# Patient Record
Sex: FEMALE | Race: WHITE | NOT HISPANIC OR LATINO | Employment: OTHER | URBAN - METROPOLITAN AREA
[De-identification: names, ages, dates, MRNs, and addresses within clinical notes are randomized per-mention and may not be internally consistent; named-entity substitution may affect disease eponyms.]

---

## 2020-10-06 ENCOUNTER — TELEPHONE (OUTPATIENT)
Dept: OBSTETRICS AND GYNECOLOGY | Facility: CLINIC | Age: 50
End: 2020-10-06

## 2020-10-06 DIAGNOSIS — Z12.31 VISIT FOR SCREENING MAMMOGRAM: Primary | ICD-10-CM

## 2020-10-06 NOTE — TELEPHONE ENCOUNTER
----- Message from Bria Manzanares sent at 10/6/2020  1:13 PM CDT -----  Contact: self  Requesting call back regarding pt getting orders for labs sent in the mail. Please call back at [phone].

## 2020-10-06 NOTE — TELEPHONE ENCOUNTER
Pt is wanting lab orders for her annual labs. Pt is also requesting ot have thyroid levels and hormones checked. Pt will pickup from .

## 2020-10-13 ENCOUNTER — TELEPHONE (OUTPATIENT)
Dept: OBSTETRICS AND GYNECOLOGY | Facility: CLINIC | Age: 50
End: 2020-10-13

## 2020-10-13 NOTE — TELEPHONE ENCOUNTER
----- Message from Lyric Valero MD sent at 10/13/2020  7:02 AM CDT -----  Please notify patient of normal mammogram result.

## 2020-10-15 ENCOUNTER — TELEPHONE (OUTPATIENT)
Dept: OBSTETRICS AND GYNECOLOGY | Facility: CLINIC | Age: 50
End: 2020-10-15

## 2020-10-15 NOTE — TELEPHONE ENCOUNTER
----- Message from Litzy Vasquez sent at 10/15/2020  4:18 PM CDT -----  Type:  Patient Returning Call    Who Called:pt   Who Left Message for Patient: na  Does the patient know what this is regarding?:na  Would the patient rather a call back or a response via [facility]? Callback   Best Call Back Number:[phone]   Additional Information:

## 2020-10-15 NOTE — TELEPHONE ENCOUNTER
Spoke w/pt, she is wanting lab orders faxed to [facility] in [place]. Orders faxed.

## 2020-10-26 RX ORDER — SODIUM BICARBONATE 650 MG/1
TABLET ORAL
COMMUNITY
Start: 2020-09-16 | End: 2022-10-03

## 2020-10-26 RX ORDER — CARVEDILOL 12.5 MG/1
TABLET ORAL
COMMUNITY
Start: 2020-07-25

## 2020-10-27 ENCOUNTER — OFFICE VISIT (OUTPATIENT)
Dept: OBSTETRICS AND GYNECOLOGY | Facility: CLINIC | Age: 50
End: 2020-10-27
Payer: COMMERCIAL

## 2020-10-27 VITALS
DIASTOLIC BLOOD PRESSURE: 92 MMHG | BODY MASS INDEX: 22.21 KG/M2 | SYSTOLIC BLOOD PRESSURE: 161 MMHG | WEIGHT: 138.19 LBS | HEIGHT: 66 IN | HEART RATE: 64 BPM

## 2020-10-27 DIAGNOSIS — Z01.419 WELL WOMAN EXAM WITH ROUTINE GYNECOLOGICAL EXAM: Primary | ICD-10-CM

## 2020-10-27 DIAGNOSIS — Z12.31 SCREENING MAMMOGRAM, ENCOUNTER FOR: ICD-10-CM

## 2020-10-27 DIAGNOSIS — Z23 FLU VACCINE NEED: ICD-10-CM

## 2020-10-27 PROCEDURE — 3008F BODY MASS INDEX DOCD: CPT | Mod: CPTII,S$GLB,, | Performed by: OBSTETRICS & GYNECOLOGY

## 2020-10-27 PROCEDURE — 90686 IIV4 VACC NO PRSV 0.5 ML IM: CPT | Mod: S$GLB,,, | Performed by: OBSTETRICS & GYNECOLOGY

## 2020-10-27 PROCEDURE — 3008F PR BODY MASS INDEX (BMI) DOCUMENTED: ICD-10-PCS | Mod: CPTII,S$GLB,, | Performed by: OBSTETRICS & GYNECOLOGY

## 2020-10-27 PROCEDURE — 90471 IMMUNIZATION ADMIN: CPT | Mod: S$GLB,,, | Performed by: OBSTETRICS & GYNECOLOGY

## 2020-10-27 PROCEDURE — 99396 PR PREVENTIVE VISIT,EST,40-64: ICD-10-PCS | Mod: 25,S$GLB,, | Performed by: OBSTETRICS & GYNECOLOGY

## 2020-10-27 PROCEDURE — 90471 PR IMMUNIZ ADMIN,1 SINGLE/COMB VAC/TOXOID: ICD-10-PCS | Mod: S$GLB,,, | Performed by: OBSTETRICS & GYNECOLOGY

## 2020-10-27 PROCEDURE — 99396 PREV VISIT EST AGE 40-64: CPT | Mod: 25,S$GLB,, | Performed by: OBSTETRICS & GYNECOLOGY

## 2020-10-27 PROCEDURE — 90686 PR FLU VACCINE, QIIV4, NO PRSV, 0.5 ML, IM: ICD-10-PCS | Mod: S$GLB,,, | Performed by: OBSTETRICS & GYNECOLOGY

## 2020-10-27 RX ORDER — LANOLIN ALCOHOL/MO/W.PET/CERES
1000 CREAM (GRAM) TOPICAL
COMMUNITY

## 2020-10-27 RX ORDER — ACETAMINOPHEN 500 MG
500 TABLET ORAL EVERY 6 HOURS PRN
COMMUNITY

## 2020-10-27 RX ORDER — CETIRIZINE HYDROCHLORIDE 10 MG/1
10 TABLET ORAL DAILY PRN
COMMUNITY

## 2020-10-27 NOTE — PROGRESS NOTES
Lubna Garcia is a 50 y.o. female  who presents for a well woman exam.  She has no issues, problems, or complaints.She is currently completely evaluation to get on a renal transplant list. She is starting to have more hot flashes and insomnia.     Past Medical History:   Diagnosis Date    HTN (hypertension)     Liver cyst     Nephrolithiasis     h/o    Pancreas cyst     Polycystic kidney disease        Past Surgical History:   Procedure Laterality Date    DIAGNOSTIC LAPAROSCOPY      HYSTEROSCOPY WITH DILATION AND CURETTAGE OF UTERUS      novasure ablation    INGUINAL HERNIA REPAIR Left     TONSILLECTOMY AND ADENOIDECTOMY         OB History    Para Term  AB Living   2 2       2   SAB TAB Ectopic Multiple Live Births                  # Outcome Date GA Lbr Reece/2nd Weight Sex Delivery Anes PTL Lv   2 Para            1 Para                History reviewed. No pertinent family history.    Social History     Tobacco Use    Smoking status: Never Smoker    Smokeless tobacco: Never Used   Substance Use Topics    Alcohol use: Not on file    Drug use: Not on file         Current Outpatient Medications:     acetaminophen (TYLENOL) 500 MG tablet, Take 500 mg by mouth every 6 (six) hours as needed., Disp: , Rfl:     acetaminophen-pamabrom-pyrilam 500-25-15 mg Tab, Take 1 tablet by mouth daily as needed., Disp: , Rfl:     carvediloL (COREG) 12.5 MG tablet, , Disp: , Rfl:     cetirizine (ZYRTEC) 10 MG tablet, Take 10 mg by mouth daily as needed., Disp: , Rfl:     cyanocobalamin (VITAMIN B-12) 1000 MCG tablet, Take 1,000 mcg by mouth., Disp: , Rfl:     sodium bicarbonate 650 MG tablet, , Disp: , Rfl:     SODIUM BICARBONATE ORAL, Take 650 mg by mouth., Disp: , Rfl:     Current Facility-Administered Medications:     influenza (QUADRIVALENT PF) vaccine 0.5 mL, 0.5 mL, Intramuscular, vaccine x 1 dose, Lyric Valero MD     Review of patient's allergies indicates:   Allergen Reactions    Sulfa  "(sulfonamide antibiotics)         ROS:  GENERAL: Denies weight gain or weight loss. Feeling well overall.   SKIN: Denies rash or lesions.   HEAD: Denies head injury or headache.   NODES: Denies enlarged lymph nodes.   CHEST: Denies shortness of breath.   CARDIOVASCULAR: Denies palpitations or chest pain.   ABDOMEN: Denies abdominal pain, constipation, diarrhea, nausea, vomiting or rectal bleeding.   URINARY: Denies frequency, dysuria, hematuria, or burning on urination.  REPRODUCTIVE: See HPI.   BREASTS: Denies pain, lumps, or nipple discharge.   HEMATOLOGIC: Denies easy bruisability or excessive bleeding.  MUSCULOSKELETAL: Denies joint pain or swelling.   NEUROLOGIC: Denies syncope or weakness.   PSYCHIATRIC: Denies depression, anxiety or mood swings.    PHYSICAL EXAM:    BP (!) 161/92   Pulse 64   Ht 5' 6" (1.676 m)   Wt 62.7 kg (138 lb 3.2 oz)   BMI 22.31 kg/m²    Body mass index is 22.31 kg/m².     APPEARANCE: Well nourished, well developed, in no acute distress.  AFFECT: WNL, alert and oriented x 3  SKIN: No acne or hirsutism  NECK: Neck symmetric without masses or thyromegaly  NODES: No inguinal, cervical, axillary, or femoral lymph node enlargement  CHEST: Good respiratory effect  ABDOMEN: Soft.  No tenderness or masses.  No hepatosplenomegaly.  No hernias.  BREASTS: Symmetrical, no skin changes or visible lesions.  No palpable masses, nipple discharge bilaterally.  PELVIC: Normal external genitalia without lesions.  Normal hair distribution.  Adequate perineal body, normal urethral meatus.  Urethra and bladder without tenderness or masses. Vagina moist and well rugated without lesions or discharge.  Cervix pink, without lesions, discharge or tenderness.  No significant cystocele or rectocele.  Bimanual exam shows uterus to be normal size, regular, mobile and nontender.  Adnexa without masses or tenderness.    EXTREMITIES: No edema.     Well woman exam with routine gynecological exam  -     Liquid-based " pap smear, screening    Screening mammogram, encounter for  -     Mammo Digital Screening Bilat w/ [facility]; Future    Flu vaccine need  -     influenza (QUADRIVALENT PF) vaccine 0.5 mL       DEXA next year  Patient was counseled today on A.C.S. Pap guidelines and recommendations for yearly pelvic exams, mammograms and monthly self breast exams; to see her PCP for other health maintenance.     Follow up in 1 year

## 2020-10-30 ENCOUNTER — PATIENT MESSAGE (OUTPATIENT)
Dept: OBSTETRICS AND GYNECOLOGY | Facility: CLINIC | Age: 50
End: 2020-10-30

## 2020-10-30 DIAGNOSIS — N95.1 MENOPAUSAL SYMPTOMS: Primary | ICD-10-CM

## 2020-10-30 RX ORDER — ESTRADIOL AND NORETHINDRONE ACETATE .5; .1 MG/1; MG/1
1 TABLET ORAL DAILY
Qty: 28 TABLET | Refills: 12 | Status: SHIPPED | OUTPATIENT
Start: 2020-10-30 | End: 2020-12-14 | Stop reason: SDUPTHER

## 2020-12-14 ENCOUNTER — PATIENT MESSAGE (OUTPATIENT)
Dept: OBSTETRICS AND GYNECOLOGY | Facility: CLINIC | Age: 50
End: 2020-12-14

## 2020-12-14 DIAGNOSIS — N95.1 MENOPAUSAL SYMPTOMS: ICD-10-CM

## 2020-12-14 RX ORDER — ESTRADIOL AND NORETHINDRONE ACETATE .5; .1 MG/1; MG/1
1 TABLET ORAL DAILY
Qty: 84 TABLET | Refills: 4 | Status: SHIPPED | OUTPATIENT
Start: 2020-12-14 | End: 2021-08-12

## 2021-05-26 ENCOUNTER — PATIENT MESSAGE (OUTPATIENT)
Dept: OBSTETRICS AND GYNECOLOGY | Facility: CLINIC | Age: 51
End: 2021-05-26

## 2021-05-27 ENCOUNTER — TELEPHONE (OUTPATIENT)
Dept: OBSTETRICS AND GYNECOLOGY | Facility: CLINIC | Age: 51
End: 2021-05-27

## 2021-08-11 ENCOUNTER — PATIENT MESSAGE (OUTPATIENT)
Dept: OBSTETRICS AND GYNECOLOGY | Facility: CLINIC | Age: 51
End: 2021-08-11

## 2021-08-11 DIAGNOSIS — N95.1 MENOPAUSAL SYMPTOMS: Primary | ICD-10-CM

## 2021-08-12 ENCOUNTER — PATIENT MESSAGE (OUTPATIENT)
Dept: OBSTETRICS AND GYNECOLOGY | Facility: CLINIC | Age: 51
End: 2021-08-12

## 2021-08-18 ENCOUNTER — PATIENT MESSAGE (OUTPATIENT)
Dept: OBSTETRICS AND GYNECOLOGY | Facility: CLINIC | Age: 51
End: 2021-08-18

## 2021-08-20 ENCOUNTER — TELEPHONE (OUTPATIENT)
Dept: OBSTETRICS AND GYNECOLOGY | Facility: CLINIC | Age: 51
End: 2021-08-20

## 2021-08-20 ENCOUNTER — PATIENT MESSAGE (OUTPATIENT)
Dept: OBSTETRICS AND GYNECOLOGY | Facility: CLINIC | Age: 51
End: 2021-08-20

## 2021-08-20 DIAGNOSIS — N95.1 MENOPAUSAL SYMPTOMS: Primary | ICD-10-CM

## 2021-08-20 RX ORDER — ESTRADIOL AND NORETHINDRONE ACETATE 1; .5 MG/1; MG/1
1 TABLET ORAL DAILY
Qty: 84 TABLET | Refills: 3 | Status: SHIPPED | OUTPATIENT
Start: 2021-08-20 | End: 2021-08-23 | Stop reason: DRUGHIGH

## 2021-08-23 ENCOUNTER — PATIENT MESSAGE (OUTPATIENT)
Dept: OBSTETRICS AND GYNECOLOGY | Facility: CLINIC | Age: 51
End: 2021-08-23

## 2021-08-23 RX ORDER — ESTRADIOL AND NORETHINDRONE ACETATE .5; .1 MG/1; MG/1
1 TABLET ORAL DAILY
Qty: 28 TABLET | Refills: 11 | Status: SHIPPED | OUTPATIENT
Start: 2021-08-23 | End: 2021-08-24

## 2021-10-05 ENCOUNTER — PATIENT MESSAGE (OUTPATIENT)
Dept: OBSTETRICS AND GYNECOLOGY | Facility: CLINIC | Age: 51
End: 2021-10-05

## 2021-10-05 ENCOUNTER — TELEPHONE (OUTPATIENT)
Dept: OBSTETRICS AND GYNECOLOGY | Facility: CLINIC | Age: 51
End: 2021-10-05

## 2021-10-13 ENCOUNTER — PATIENT MESSAGE (OUTPATIENT)
Dept: OBSTETRICS AND GYNECOLOGY | Facility: CLINIC | Age: 51
End: 2021-10-13
Payer: COMMERCIAL

## 2021-10-19 ENCOUNTER — PATIENT MESSAGE (OUTPATIENT)
Dept: OBSTETRICS AND GYNECOLOGY | Facility: CLINIC | Age: 51
End: 2021-10-19

## 2021-10-28 ENCOUNTER — OFFICE VISIT (OUTPATIENT)
Dept: OBSTETRICS AND GYNECOLOGY | Facility: CLINIC | Age: 51
End: 2021-10-28
Payer: COMMERCIAL

## 2021-10-28 VITALS
WEIGHT: 138.38 LBS | BODY MASS INDEX: 22.24 KG/M2 | SYSTOLIC BLOOD PRESSURE: 136 MMHG | HEART RATE: 67 BPM | HEIGHT: 66 IN | DIASTOLIC BLOOD PRESSURE: 90 MMHG

## 2021-10-28 DIAGNOSIS — N95.1 MENOPAUSAL SYMPTOMS: ICD-10-CM

## 2021-10-28 DIAGNOSIS — Z13.820 SCREENING FOR OSTEOPOROSIS: ICD-10-CM

## 2021-10-28 DIAGNOSIS — Z12.31 SCREENING MAMMOGRAM, ENCOUNTER FOR: ICD-10-CM

## 2021-10-28 DIAGNOSIS — Z00.00 LABORATORY EXAM ORDERED AS PART OF ROUTINE GENERAL MEDICAL EXAMINATION: ICD-10-CM

## 2021-10-28 DIAGNOSIS — Z01.419 WELL WOMAN EXAM WITH ROUTINE GYNECOLOGICAL EXAM: Primary | ICD-10-CM

## 2021-10-28 PROCEDURE — 99396 PR PREVENTIVE VISIT,EST,40-64: ICD-10-PCS | Mod: S$GLB,,, | Performed by: OBSTETRICS & GYNECOLOGY

## 2021-10-28 PROCEDURE — 3080F DIAST BP >= 90 MM HG: CPT | Mod: CPTII,S$GLB,, | Performed by: OBSTETRICS & GYNECOLOGY

## 2021-10-28 PROCEDURE — 3075F SYST BP GE 130 - 139MM HG: CPT | Mod: CPTII,S$GLB,, | Performed by: OBSTETRICS & GYNECOLOGY

## 2021-10-28 PROCEDURE — 3075F PR MOST RECENT SYSTOLIC BLOOD PRESS GE 130-139MM HG: ICD-10-PCS | Mod: CPTII,S$GLB,, | Performed by: OBSTETRICS & GYNECOLOGY

## 2021-10-28 PROCEDURE — 1159F PR MEDICATION LIST DOCUMENTED IN MEDICAL RECORD: ICD-10-PCS | Mod: CPTII,S$GLB,, | Performed by: OBSTETRICS & GYNECOLOGY

## 2021-10-28 PROCEDURE — 3008F BODY MASS INDEX DOCD: CPT | Mod: CPTII,S$GLB,, | Performed by: OBSTETRICS & GYNECOLOGY

## 2021-10-28 PROCEDURE — 1159F MED LIST DOCD IN RCRD: CPT | Mod: CPTII,S$GLB,, | Performed by: OBSTETRICS & GYNECOLOGY

## 2021-10-28 PROCEDURE — 3008F PR BODY MASS INDEX (BMI) DOCUMENTED: ICD-10-PCS | Mod: CPTII,S$GLB,, | Performed by: OBSTETRICS & GYNECOLOGY

## 2021-10-28 PROCEDURE — 3080F PR MOST RECENT DIASTOLIC BLOOD PRESSURE >= 90 MM HG: ICD-10-PCS | Mod: CPTII,S$GLB,, | Performed by: OBSTETRICS & GYNECOLOGY

## 2021-10-28 PROCEDURE — 99396 PREV VISIT EST AGE 40-64: CPT | Mod: S$GLB,,, | Performed by: OBSTETRICS & GYNECOLOGY

## 2021-10-28 RX ORDER — ZOLPIDEM TARTRATE 5 MG/1
TABLET ORAL
COMMUNITY
Start: 2021-10-25

## 2021-10-28 RX ORDER — METHOCARBAMOL 500 MG/1
TABLET, FILM COATED ORAL
COMMUNITY
Start: 2021-05-27 | End: 2022-10-03

## 2021-10-28 RX ORDER — CALCITRIOL 0.25 UG/1
CAPSULE ORAL
COMMUNITY
Start: 2021-09-02 | End: 2022-10-03

## 2021-10-28 RX ORDER — ESTRADIOL AND NORETHINDRONE ACETATE .5; .1 MG/1; MG/1
1 TABLET ORAL DAILY
Qty: 84 TABLET | Refills: 3 | Status: SHIPPED | OUTPATIENT
Start: 2021-10-28 | End: 2022-02-28 | Stop reason: SDUPTHER

## 2021-12-27 ENCOUNTER — PATIENT MESSAGE (OUTPATIENT)
Dept: OBSTETRICS AND GYNECOLOGY | Facility: CLINIC | Age: 51
End: 2021-12-27
Payer: COMMERCIAL

## 2021-12-27 DIAGNOSIS — N95.1 MENOPAUSAL SYMPTOMS: Primary | ICD-10-CM

## 2021-12-27 RX ORDER — PAROXETINE 10 MG/1
10 TABLET, FILM COATED ORAL EVERY MORNING
Qty: 30 TABLET | Refills: 11 | Status: SHIPPED | OUTPATIENT
Start: 2021-12-27 | End: 2022-10-03

## 2021-12-30 ENCOUNTER — PATIENT MESSAGE (OUTPATIENT)
Dept: OBSTETRICS AND GYNECOLOGY | Facility: CLINIC | Age: 51
End: 2021-12-30
Payer: COMMERCIAL

## 2022-02-28 DIAGNOSIS — N95.1 MENOPAUSAL SYMPTOMS: ICD-10-CM

## 2022-02-28 RX ORDER — ESTRADIOL AND NORETHINDRONE ACETATE .5; .1 MG/1; MG/1
1 TABLET ORAL DAILY
Qty: 84 TABLET | Refills: 2 | Status: SHIPPED | OUTPATIENT
Start: 2022-02-28 | End: 2022-10-03 | Stop reason: SDUPTHER

## 2022-09-28 DIAGNOSIS — R92.8 ABNORMAL MAMMOGRAM: Primary | ICD-10-CM

## 2022-10-02 NOTE — PROGRESS NOTES
Lubna Garcia is a 51 y.o. female  who presents for a well woman exam.  She has no issues, problems, or complaints. She will start weaning hormones.     Past Medical History:   Diagnosis Date    Anemia     HTN (hypertension)     Liver cyst     Menopause     HRT    Nephrolithiasis     h/o    Pancreas cyst     Polycystic kidney disease        Past Surgical History:   Procedure Laterality Date    DIAGNOSTIC LAPAROSCOPY      HYSTEROSCOPY WITH DILATION AND CURETTAGE OF UTERUS      novasure ablation    INGUINAL HERNIA REPAIR Left 2011    KIDNEY TRANSPLANT  2021    TONSILLECTOMY AND ADENOIDECTOMY         OB History    Para Term  AB Living   2 2       2   SAB IAB Ectopic Multiple Live Births                  # Outcome Date GA Lbr Reece/2nd Weight Sex Delivery Anes PTL Lv   2 Para            1 Para                Family History   Problem Relation Age of Onset    Kidney disease Mother     Hypertension Mother     No Known Problems Father        Social History     Tobacco Use    Smoking status: Never    Smokeless tobacco: Never   Substance Use Topics    Alcohol use: Never    Drug use: Never         Current Outpatient Medications:      INV predniSONE/placebo tablet, , Disp: , Rfl:     acetaminophen (TYLENOL) 500 MG tablet, Take 500 mg by mouth every 6 (six) hours as needed., Disp: , Rfl:     acetaminophen-pamabrom-pyrilam 500-25-15 mg Tab, Take 1 tablet by mouth daily as needed., Disp: , Rfl:     amoxicillin (AMOXIL) 500 MG capsule, TAKE 4 CAPSULES BY MOUTH 1 HOUR PRIOR TO DENTAL APPOINTMENT, Disp: , Rfl:     carvediloL (COREG) 12.5 MG tablet, , Disp: , Rfl:     cetirizine (ZYRTEC) 10 MG tablet, Take 10 mg by mouth daily as needed., Disp: , Rfl:     cyanocobalamin (VITAMIN B-12) 1000 MCG tablet, Take 1,000 mcg by mouth., Disp: , Rfl:     docusate sodium (COLACE) 100 MG capsule, Take 100 mg by mouth 2 (two) times daily., Disp: , Rfl:     ergocalciferol (ERGOCALCIFEROL) 50,000 unit Cap, once a week., Disp:  ", Rfl:     famotidine (PEPCID) 20 MG tablet, Take 20 mg by mouth., Disp: , Rfl:     magnesium oxide (MAG-OX) 400 mg (241.3 mg magnesium) tablet, Take 400 mg by mouth 2 (two) times daily., Disp: , Rfl:     morphine (MS CONTIN) 15 MG 12 hr tablet, , Disp: , Rfl:     mycophenolate (CELLCEPT) 500 mg Tab, , Disp: , Rfl:     ONETOUCH DELICA PLUS LANCET 33 gauge Misc, , Disp: , Rfl:     ONETOUCH VERIO TEST STRIPS Strp, , Disp: , Rfl:     sod phos di, mono/K phos mono (PHOSPHA 250 NEUTRAL ORAL), , Disp: , Rfl:     tacrolimus (PROGRAF) 1 MG Cap, Take 1 mg by mouth., Disp: , Rfl:     traMADoL (ULTRAM) 50 mg tablet, , Disp: , Rfl:     zolpidem (AMBIEN) 5 MG Tab, , Disp: , Rfl:     estradiol-norethindrone acet 0.5-0.1 mg per tablet, Take 1 tablet by mouth once daily., Disp: 84 tablet, Rfl: 1     Review of patient's allergies indicates:   Allergen Reactions    Sulfa (sulfonamide antibiotics)         ROS:  GENERAL: Denies weight gain or weight loss. Feeling well overall.   SKIN: Denies rash or lesions.   HEAD: Denies head injury or headache.   NODES: Denies enlarged lymph nodes.   CHEST: Denies shortness of breath.   CARDIOVASCULAR: Denies palpitations or chest pain.   ABDOMEN: Denies abdominal pain, constipation, diarrhea, nausea, vomiting or rectal bleeding.   URINARY: Denies frequency, dysuria, hematuria, or burning on urination.  REPRODUCTIVE: See HPI.   BREASTS: Denies pain, lumps, or nipple discharge.   HEMATOLOGIC: Denies easy bruisability or excessive bleeding.  MUSCULOSKELETAL: Denies joint pain or swelling.   NEUROLOGIC: Denies syncope or weakness.   PSYCHIATRIC: Denies depression, anxiety or mood swings.    PHYSICAL EXAM:    BP (!) 148/84   Pulse 85   Ht 5' 6" (1.676 m)   Wt 54.9 kg (121 lb)   BMI 19.53 kg/m²    Body mass index is 19.53 kg/m².     APPEARANCE: Well nourished, well developed, in no acute distress.  AFFECT: WNL, alert and oriented x 3  SKIN: No acne or hirsutism  NECK: Neck symmetric without masses or " thyromegaly  NODES: No inguinal, cervical, axillary, or femoral lymph node enlargement  CHEST: Good respiratory effect  ABDOMEN: Soft.  No tenderness or masses.  No hepatosplenomegaly.  No hernias.  BREASTS: Symmetrical, no skin changes or visible lesions.  No palpable masses, nipple discharge bilaterally.  PELVIC: Normal external genitalia without lesions.  Normal hair distribution.  Adequate perineal body, normal urethral meatus.  Urethra and bladder without tenderness or masses. Vagina moist and well rugated without lesions or discharge.  Cervix pink, without lesions, discharge or tenderness.  No significant cystocele or rectocele.  Bimanual exam shows uterus to be normal size, regular, mobile and nontender.  Adnexa without masses or tenderness.    EXTREMITIES: No edema.     Well woman exam with routine gynecological exam  -     Liquid-based pap smear, screening    Screening mammogram, encounter for  -     Mammo Digital Screening Bilat w/ [facility]; Future; Expected date: 09/25/2023    Screening for osteoporosis  -     DXA Bone Density Spine And Hip; Future    Menopausal symptoms  -     estradiol-norethindrone acet 0.5-0.1 mg per tablet; Take 1 tablet by mouth once daily.  Dispense: 84 tablet; Refill: 1     Trial of 1/2 tablet daily  Patient was counseled today on A.C.S. Pap guidelines and recommendations for yearly pelvic exams, mammograms and monthly self breast exams; to see her PCP for other health maintenance.     Follow up in 1 year

## 2022-10-03 ENCOUNTER — PATIENT MESSAGE (OUTPATIENT)
Dept: OBSTETRICS AND GYNECOLOGY | Facility: CLINIC | Age: 52
End: 2022-10-03

## 2022-10-03 ENCOUNTER — TELEPHONE (OUTPATIENT)
Dept: OBSTETRICS AND GYNECOLOGY | Facility: CLINIC | Age: 52
End: 2022-10-03
Payer: COMMERCIAL

## 2022-10-03 ENCOUNTER — OFFICE VISIT (OUTPATIENT)
Dept: OBSTETRICS AND GYNECOLOGY | Facility: CLINIC | Age: 52
End: 2022-10-03
Payer: COMMERCIAL

## 2022-10-03 VITALS
HEIGHT: 66 IN | HEART RATE: 85 BPM | BODY MASS INDEX: 19.44 KG/M2 | WEIGHT: 121 LBS | SYSTOLIC BLOOD PRESSURE: 148 MMHG | DIASTOLIC BLOOD PRESSURE: 84 MMHG

## 2022-10-03 DIAGNOSIS — Z12.31 SCREENING MAMMOGRAM, ENCOUNTER FOR: ICD-10-CM

## 2022-10-03 DIAGNOSIS — Z13.820 SCREENING FOR OSTEOPOROSIS: ICD-10-CM

## 2022-10-03 DIAGNOSIS — Z01.419 WELL WOMAN EXAM WITH ROUTINE GYNECOLOGICAL EXAM: Primary | ICD-10-CM

## 2022-10-03 DIAGNOSIS — N95.1 MENOPAUSAL SYMPTOMS: ICD-10-CM

## 2022-10-03 PROCEDURE — 99396 PR PREVENTIVE VISIT,EST,40-64: ICD-10-PCS | Mod: S$GLB,,, | Performed by: OBSTETRICS & GYNECOLOGY

## 2022-10-03 PROCEDURE — 3079F PR MOST RECENT DIASTOLIC BLOOD PRESSURE 80-89 MM HG: ICD-10-PCS | Mod: CPTII,S$GLB,, | Performed by: OBSTETRICS & GYNECOLOGY

## 2022-10-03 PROCEDURE — 3008F PR BODY MASS INDEX (BMI) DOCUMENTED: ICD-10-PCS | Mod: CPTII,S$GLB,, | Performed by: OBSTETRICS & GYNECOLOGY

## 2022-10-03 PROCEDURE — 3077F PR MOST RECENT SYSTOLIC BLOOD PRESSURE >= 140 MM HG: ICD-10-PCS | Mod: CPTII,S$GLB,, | Performed by: OBSTETRICS & GYNECOLOGY

## 2022-10-03 PROCEDURE — 3079F DIAST BP 80-89 MM HG: CPT | Mod: CPTII,S$GLB,, | Performed by: OBSTETRICS & GYNECOLOGY

## 2022-10-03 PROCEDURE — 3008F BODY MASS INDEX DOCD: CPT | Mod: CPTII,S$GLB,, | Performed by: OBSTETRICS & GYNECOLOGY

## 2022-10-03 PROCEDURE — 99396 PREV VISIT EST AGE 40-64: CPT | Mod: S$GLB,,, | Performed by: OBSTETRICS & GYNECOLOGY

## 2022-10-03 PROCEDURE — 1159F MED LIST DOCD IN RCRD: CPT | Mod: CPTII,S$GLB,, | Performed by: OBSTETRICS & GYNECOLOGY

## 2022-10-03 PROCEDURE — 1159F PR MEDICATION LIST DOCUMENTED IN MEDICAL RECORD: ICD-10-PCS | Mod: CPTII,S$GLB,, | Performed by: OBSTETRICS & GYNECOLOGY

## 2022-10-03 PROCEDURE — 3077F SYST BP >= 140 MM HG: CPT | Mod: CPTII,S$GLB,, | Performed by: OBSTETRICS & GYNECOLOGY

## 2022-10-03 RX ORDER — MYCOPHENOLATE MOFETIL 500 MG/1
TABLET ORAL
COMMUNITY
Start: 2022-08-02

## 2022-10-03 RX ORDER — ERGOCALCIFEROL 1.25 MG/1
CAPSULE ORAL
COMMUNITY
Start: 2021-12-07

## 2022-10-03 RX ORDER — TRAMADOL HYDROCHLORIDE 50 MG/1
TABLET ORAL
COMMUNITY
Start: 2022-08-11 | End: 2023-11-10

## 2022-10-03 RX ORDER — FAMOTIDINE 20 MG/1
20 TABLET, FILM COATED ORAL
COMMUNITY
Start: 2021-12-07

## 2022-10-03 RX ORDER — DOCUSATE SODIUM 100 MG/1
100 CAPSULE, LIQUID FILLED ORAL 2 TIMES DAILY
COMMUNITY
Start: 2022-09-08 | End: 2023-11-10

## 2022-10-03 RX ORDER — LANCETS 33 GAUGE
EACH MISCELLANEOUS
COMMUNITY
Start: 2022-08-15 | End: 2023-10-11

## 2022-10-03 RX ORDER — BLOOD-GLUCOSE METER
EACH MISCELLANEOUS
COMMUNITY
Start: 2022-08-15 | End: 2023-10-11

## 2022-10-03 RX ORDER — TACROLIMUS 1 MG/1
1 CAPSULE ORAL
COMMUNITY
Start: 2021-12-07

## 2022-10-03 RX ORDER — MORPHINE SULFATE 15 MG/1
TABLET, FILM COATED, EXTENDED RELEASE ORAL
COMMUNITY
Start: 2022-09-29 | End: 2023-10-11

## 2022-10-03 RX ORDER — ESTRADIOL AND NORETHINDRONE ACETATE .5; .1 MG/1; MG/1
1 TABLET ORAL DAILY
Qty: 84 TABLET | Refills: 1 | Status: SHIPPED | OUTPATIENT
Start: 2022-10-03 | End: 2022-10-22

## 2022-10-03 RX ORDER — LANOLIN ALCOHOL/MO/W.PET/CERES
400 CREAM (GRAM) TOPICAL 2 TIMES DAILY
COMMUNITY
Start: 2021-12-07

## 2022-10-03 RX ORDER — AMOXICILLIN 500 MG/1
CAPSULE ORAL
COMMUNITY
Start: 2022-05-11 | End: 2023-10-11

## 2022-10-03 NOTE — TELEPHONE ENCOUNTER
Spoke with pts .  He states pt. Needs to discuss mammogram with Dr. Valero today and wants her to have her annual appt. Today regardless of what ins. Says.

## 2022-10-06 LAB — Lab: NORMAL

## 2023-03-14 ENCOUNTER — TELEPHONE (OUTPATIENT)
Dept: OBSTETRICS AND GYNECOLOGY | Facility: CLINIC | Age: 53
End: 2023-03-14
Payer: COMMERCIAL

## 2023-03-14 DIAGNOSIS — N63.12 UNSPECIFIED LUMP IN THE RIGHT BREAST, UPPER INNER QUADRANT: ICD-10-CM

## 2023-03-14 DIAGNOSIS — N63.14 UNSPECIFIED LUMP IN THE RIGHT BREAST, LOWER INNER QUADRANT: Primary | ICD-10-CM

## 2023-03-14 NOTE — TELEPHONE ENCOUNTER
----- Message from Adwoa Lane sent at 3/14/2023 11:37 AM CDT -----  Contact: Lubna  Lubna called in to say that she was advise to repeat her mammogram in 6 months and she wants to know if the orders has been sent over, Please call her at [phone].    Thanks  Td

## 2023-03-14 NOTE — TELEPHONE ENCOUNTER
----- Message from Adwoa Lane sent at 3/14/2023 11:37 AM CDT -----  Contact: Lubna  Lubna called in to say that she was advise to repeat her mammogram in 6 months and she wants to know if the orders has been sent over, Please call her at [phone].    Thanks  Td

## 2023-03-14 NOTE — TELEPHONE ENCOUNTER
----- Message from Adwoa Lane sent at 3/14/2023 11:43 AM CDT -----  Contact: Lubna  Type:  Patient Returning Call    Who Called: Lubna  Who Left Message for Patient: Vimal Bourgeois LPN  Does the patient know what this is regarding?: Appointment access  Would the patient rather a call back or a response via [facility]? Call back   Best Call Back Number: Please call her at [phone]  Additional Information:  She was calling to reschedule her annual in October from Dr. Vlaero to Dr. Estrada but Dr. Estrada next open was on March of 2024

## 2023-03-14 NOTE — TELEPHONE ENCOUNTER
New pt appt made with Dr Estrada for Oct  but her she will need her 6 month Mammogram ordered for April please

## 2023-04-11 ENCOUNTER — PATIENT MESSAGE (OUTPATIENT)
Dept: OBSTETRICS AND GYNECOLOGY | Facility: CLINIC | Age: 53
End: 2023-04-11
Payer: COMMERCIAL

## 2023-04-11 DIAGNOSIS — R92.8 ABNORMAL SCREENING MAMMOGRAM: Primary | ICD-10-CM

## 2023-04-11 NOTE — PROGRESS NOTES
Please let pt know her breast imaging reports show an area they want to evaluate further with an us guided biopsy. We can refer her to Dr Tamez at [facility] to get that done unless she is wanting to go elsewhere

## 2023-04-12 ENCOUNTER — PATIENT MESSAGE (OUTPATIENT)
Dept: OBSTETRICS AND GYNECOLOGY | Facility: CLINIC | Age: 53
End: 2023-04-12
Payer: COMMERCIAL

## 2023-04-12 NOTE — TELEPHONE ENCOUNTER
I spoke with angie, she was needing Dr Angie Patrick name so she can see if she is on her insurance plan.

## 2023-10-11 ENCOUNTER — TELEPHONE (OUTPATIENT)
Dept: OBSTETRICS AND GYNECOLOGY | Facility: CLINIC | Age: 53
End: 2023-10-11
Payer: MEDICARE

## 2023-10-11 PROBLEM — D24.1 INTRADUCTAL PAPILLOMA OF BREAST, RIGHT: Status: ACTIVE | Noted: 2023-10-11

## 2023-10-11 PROBLEM — Z79.899 IMMUNOSUPPRESSIVE MANAGEMENT ENCOUNTER FOLLOWING KIDNEY TRANSPLANT: Status: ACTIVE | Noted: 2022-03-07

## 2023-10-11 PROBLEM — Z94.0 IMMUNOSUPPRESSIVE MANAGEMENT ENCOUNTER FOLLOWING KIDNEY TRANSPLANT: Status: ACTIVE | Noted: 2022-03-07

## 2023-10-11 PROBLEM — M85.831 OSTEOPENIA OF RIGHT FOREARM: Status: ACTIVE | Noted: 2021-12-08

## 2023-10-11 PROBLEM — Q44.6 POLYCYSTIC LIVER DISEASE: Status: ACTIVE | Noted: 2021-12-08

## 2023-10-11 PROBLEM — N18.5 CHRONIC KIDNEY DISEASE (CKD), STAGE V: Status: ACTIVE | Noted: 2023-10-11

## 2023-10-11 PROBLEM — N20.0 KIDNEY STONE: Status: ACTIVE | Noted: 2023-10-11

## 2023-10-11 PROBLEM — Z94.0 STATUS POST LIVING-DONOR KIDNEY TRANSPLANTATION: Status: ACTIVE | Noted: 2021-12-08

## 2023-10-11 PROBLEM — Q61.3 POLYCYSTIC KIDNEY DISEASE: Status: ACTIVE | Noted: 2020-02-14

## 2023-10-11 PROBLEM — I10 HYPERTENSION: Status: ACTIVE | Noted: 2023-10-11

## 2023-10-11 PROBLEM — Z94.0 HISTORY OF KIDNEY TRANSPLANT: Status: ACTIVE | Noted: 2022-01-01

## 2023-10-11 PROBLEM — K75.0 LIVER ABSCESS: Status: ACTIVE | Noted: 2022-09-04

## 2023-10-11 NOTE — TELEPHONE ENCOUNTER
----- Message from Akosua Diez sent at 10/11/2023  2:50 PM CDT -----  Contact: self  Type: Staff Message  Caller: Lubna Garcia  Call Back Number: [phone]  Nature of the Call: pt is in the ER in [place] and has to reschedule apt. She would like to be seen in early November. Please advise  Additional Information: na

## 2023-10-17 RX ORDER — TRETINOIN 0.25 MG/G
CREAM TOPICAL
COMMUNITY
Start: 2023-09-20

## 2023-10-17 RX ORDER — SOD PHOS DI, MONO/K PHOS MONO 250 MG
2 TABLET ORAL 3 TIMES DAILY
COMMUNITY
Start: 2023-09-19

## 2023-10-17 RX ORDER — PANTOPRAZOLE SODIUM 40 MG/1
40 TABLET, DELAYED RELEASE ORAL
COMMUNITY
Start: 2023-08-24 | End: 2023-11-10

## 2023-10-17 RX ORDER — ONDANSETRON 4 MG/1
TABLET, FILM COATED ORAL
COMMUNITY
Start: 2023-09-20

## 2023-10-17 NOTE — PROGRESS NOTES
"CC:  WELL WOMAN (menopausal since "years ago")  Patient Care Team:  Bubba Araujo MD as PCP - General (Nephrology)  Lubna Tamez MD as Consulting Physician (General Surgery)    NEW PATIENT       HPI:  Patient is a 53 y.o. who presents for her well woman exam today.  History reviewed with patient. Patient recently stood up too quickly and fell on her bathroom tile and fractured her nose and a mild concussion but feeling better now.  Needs mmg orders from us as she saw Joi for her intraductal papilloma and then the follow up visit after that and said she go back to the annual mmg. Patient also trying to decrease and dc her hrt. Tried a while back and had too much trouble sleeping and night sweats which made her more fatigued.Discussed trying 1/2 tablet q d and that it advisable to decrease or discontinue if possible. Discussed switching to transdermal but pt wants to stop her hrt completely if possible. Also had covid in aug 2023 and was given paxlovid which caused tacromilu toxicity and her creatinine has not completely recovered to baseline yet. Patient is without complaints today.     HRT: combination HRT  HX ABNL PAPS: yrs ago-no treatment needed    REVIEW OF PRIOR DATA/ HEALTH MAINTENANCE:  LAST ANNUAL:   October 3 2022    LAST MMG (screening)- 2023- [facility]   LAST LABS- does with specialist    LAST COLONOSCOPY- 20+yrs ago   LAST DEXA- - osteopenia at [place] of town     Past Medical History:   Diagnosis Date    Anemia     History of kidney transplant     HTN (hypertension)     Intraductal papilloma of breast, right     bx by Joi 2023    Liver cyst     Menopause     HRT    Nephrolithiasis     h/o    Pancreas cyst     Polycystic kidney disease      SURGICAL HX:   has a past surgical history that includes Tonsillectomy and adenoidectomy; Diagnostic laparoscopy; Inguinal hernia repair (Left, ); Hysteroscopy with dilation and curettage of uterus (); " Kidney transplant (12/07/2021); Nephrectomy (Bilateral); and Breast biopsy (Right).    SOCIAL HX:    reports that she has never smoked. She has never used smokeless tobacco. She reports that she does not drink alcohol and does not use drugs.    FAMILY HX:   family history includes Hypertension in her mother; Kidney disease in her mother; Liver disease in her maternal grandmother and mother; No Known Problems in her father; Polycystic kidney disease in her maternal grandmother. .    ALLERGIES:  Sulfa (sulfonamide antibiotics), Metoclopramide hcl, Nsaids (non-steroidal anti-inflammatory drug), and Prochlorperazine    Current Outpatient Medications   Medication Instructions     INV predniSONE/placebo tablet No dose, route, or frequency recorded.    acetaminophen (TYLENOL) 500 mg, Oral, Every 6 hours PRN    acetaminophen-pamabrom-pyrilam 500-25-15 mg Tab 1 tablet, Oral, Daily PRN    aspirin (ECOTRIN) 81 MG EC tablet 1 tablet, Oral, Daily    carvediloL (COREG) 12.5 MG tablet No dose, route, or frequency recorded.    cetirizine (ZYRTEC) 10 mg, Oral, Daily PRN    cyanocobalamin (VITAMIN B-12) 1,000 mcg, Oral    docusate sodium (COLACE) 100 mg, Oral, 2 times daily    ergocalciferol (ERGOCALCIFEROL) 50,000 unit Cap once a week.    estradiol-norethindrone acet 0.5-0.1 mg per tablet 1 tablet, Oral, Daily    famotidine (PEPCID) 20 mg, Oral    HYDROcodone-acetaminophen (NORCO) 5-325 mg per tablet No dose, route, or frequency recorded.    insulin glargine U-300 conc (TOUJEO MAX U-300 SOLOSTAR) 300 unit/mL (3 mL) insulin pen 1 tablet, Oral, Daily    magnesium oxide (MAG-OX) 400 mg, Oral, 2 times daily    morphine (MS CONTIN) 15 MG 12 hr tablet 1 tablet, Oral    mycophenolate (CELLCEPT) 500 mg Tab No dose, route, or frequency recorded.    naloxegoL (MOVANTIK) 25 mg, Oral, Daily PRN    ondansetron (ZOFRAN) 4 MG tablet No dose, route, or frequency recorded.    pantoprazole (PROTONIX) 40 mg, Oral    PHOSPHOROUS 250 mg Tab 2 tablets,  Oral, 3 times daily    senna-docusate 8.6-50 mg (PERICOLACE) 8.6-50 mg per tablet 1 tablet, Oral, Daily    sod phos di, mono/K phos mono (PHOSPHA 250 NEUTRAL ORAL) No dose, route, or frequency recorded.    sodium chloride (OCEAN) 0.65 % nasal spray 2 sprays, Nasal    tacrolimus (PROGRAF) 1 mg, Oral    traMADoL (ULTRAM) 50 mg tablet No dose, route, or frequency recorded.    tretinoin (RETIN-A) 0.025 % cream APPLY EVERY NIGHT AT BEDTIME 2 TO 7 TIMES A WEEK. APPLY AFTER MOISTURIZER. MAY INCREASE USE AS TOLERATED    zolpidem (AMBIEN) 5 MG Tab No dose, route, or frequency recorded.     ROS:  CONST:  No fever, chills, fatigue or unexpected changes in weight   CV: No chest pain or palpitations   RESP:  No shortness of breath or cough   GI: No abd pain, vomiting, diarrhea, blood in stool, or changes in bowel mvmts   SKIN: No rashes or lesions  MUSCULOSKELETAL: No joint swelling or pain   PSYCH: No changes in mood or insomia   BREASTS: No asymmetry, lumps, pain, nipple discharge, or skin changes   :  No dysuria, urgency, frequency, hematuria or incontinence           No vag dc, itching, odor or dryness           No pelvic pain, dyspareunia, or abnormal vaginal bleeding     VITALS:  Blood pressure 130/87, weight 58.4 kg (128 lb 12.8 oz).  Body mass index is 20.79 kg/m².     PHYSICAL EXAM-  APPEARANCE: Well appearing, in no acute distress.   NECK: Neck symmetric   CV:  Normal rate   PULM: Normal resp rate, no resp distress, normal resp effort   PSYCH:  Normal mood and affect, cooperative   SKIN: No rashes, lesions, or abnormal bruising   LYMPH: No inguinal or axillary adenopathy   ABD: Soft, without tenderness or masses.   BREAST: Symmetrical, no nipple changes, no skin changes, No palpable masses   PELVIC:  VULVA: Normal female genitalia. No lesions.   URETHRAL MEATUS: No masses, no significant prolapse.   BLADDER/ URETHRA: No masses or suprapubic tenderness   VAGINA: No lesions. +atrophic changes. No discharge   CVX: No  lesions   UTERUS: Normal size/shape, mobile, non-tender   ADNEXA: No masses, tenderness, or fullness   ANUS/ PERINEUM: Normal tone.  No lesions.     *female chaperone present for entire exam      ASSESSMENT and PLAN:  Encounter for well woman exam with routine gynecological exam  -     Liquid-based pap smear, screening    Breast cancer screening by mammogram  -     Mammo Digital Screening Bilat w/ [facility]; Future; Expected date: 10/31/2023    Menopausal state    Encounter for osteoporosis screening in asymptomatic postmenopausal patient  -     DXA Bone Density Axial Skeleton 1 or more sites; Future; Expected date: 11/01/2023    Screening for colon cancer  -     Ambulatory referral/consult to Gastroenterology; Future; Expected date: 10/25/2023    Menopausal symptoms  -     estradiol-norethindrone acet 0.5-0.1 mg per tablet; Take 1 tablet by mouth once daily.  Dispense: 84 tablet; Refill: 1       FOLLOWUP:  1 year for wwe or sooner prn    COUNSELING:  Patient was counseled today on recommendation for yearly pelvic exam, current Pap guidelines, self breast exams, annual screening mammograms, routine screening colonoscopy , and bone density testing.  Discussed vitamin D and calcium supplements as well as weight bearing exercise to decrease risks. Encouraged patient to see her PCP for other health maintenance.

## 2023-10-18 ENCOUNTER — OFFICE VISIT (OUTPATIENT)
Dept: OBSTETRICS AND GYNECOLOGY | Facility: CLINIC | Age: 53
End: 2023-10-18
Payer: COMMERCIAL

## 2023-10-18 VITALS
SYSTOLIC BLOOD PRESSURE: 130 MMHG | BODY MASS INDEX: 20.79 KG/M2 | DIASTOLIC BLOOD PRESSURE: 87 MMHG | WEIGHT: 128.81 LBS

## 2023-10-18 DIAGNOSIS — N95.1 MENOPAUSAL SYMPTOMS: ICD-10-CM

## 2023-10-18 DIAGNOSIS — N95.1 MENOPAUSAL STATE: ICD-10-CM

## 2023-10-18 DIAGNOSIS — Z12.31 BREAST CANCER SCREENING BY MAMMOGRAM: ICD-10-CM

## 2023-10-18 DIAGNOSIS — Z12.11 SCREENING FOR COLON CANCER: ICD-10-CM

## 2023-10-18 DIAGNOSIS — Z13.820 ENCOUNTER FOR OSTEOPOROSIS SCREENING IN ASYMPTOMATIC POSTMENOPAUSAL PATIENT: ICD-10-CM

## 2023-10-18 DIAGNOSIS — Z01.419 ENCOUNTER FOR WELL WOMAN EXAM WITH ROUTINE GYNECOLOGICAL EXAM: Primary | ICD-10-CM

## 2023-10-18 DIAGNOSIS — Z78.0 ENCOUNTER FOR OSTEOPOROSIS SCREENING IN ASYMPTOMATIC POSTMENOPAUSAL PATIENT: ICD-10-CM

## 2023-10-18 PROCEDURE — 99396 PR PREVENTIVE VISIT,EST,40-64: ICD-10-PCS | Mod: S$GLB,,, | Performed by: OBSTETRICS & GYNECOLOGY

## 2023-10-18 PROCEDURE — 99396 PREV VISIT EST AGE 40-64: CPT | Mod: S$GLB,,, | Performed by: OBSTETRICS & GYNECOLOGY

## 2023-10-18 RX ORDER — ESTRADIOL AND NORETHINDRONE ACETATE .5; .1 MG/1; MG/1
1 TABLET ORAL DAILY
Qty: 84 TABLET | Refills: 1 | Status: SHIPPED | OUTPATIENT
Start: 2023-10-18 | End: 2023-10-23

## 2023-10-18 RX ORDER — AMOXICILLIN 250 MG
1 CAPSULE ORAL DAILY
COMMUNITY
Start: 2023-10-13 | End: 2023-11-10

## 2023-10-18 RX ORDER — INSULIN GLARGINE 300 U/ML
1 INJECTION, SOLUTION SUBCUTANEOUS DAILY
COMMUNITY
End: 2023-11-10

## 2023-10-18 RX ORDER — MORPHINE SULFATE 15 MG/1
1 TABLET, FILM COATED, EXTENDED RELEASE ORAL
COMMUNITY
Start: 2022-10-30 | End: 2023-11-10

## 2023-10-18 RX ORDER — HYDROCODONE BITARTRATE AND ACETAMINOPHEN 5; 325 MG/1; MG/1
TABLET ORAL
COMMUNITY
Start: 2023-10-13 | End: 2023-11-10

## 2023-10-18 RX ORDER — ASPIRIN 81 MG/1
1 TABLET ORAL DAILY
COMMUNITY
End: 2023-11-10

## 2023-10-19 DIAGNOSIS — N95.1 MENOPAUSAL SYMPTOMS: Primary | ICD-10-CM

## 2023-10-23 ENCOUNTER — PATIENT MESSAGE (OUTPATIENT)
Dept: OBSTETRICS AND GYNECOLOGY | Facility: CLINIC | Age: 53
End: 2023-10-23
Payer: MEDICARE

## 2023-10-23 DIAGNOSIS — N95.1 MENOPAUSAL SYMPTOMS: ICD-10-CM

## 2023-10-23 LAB — Lab: NORMAL

## 2023-10-23 RX ORDER — ESTRADIOL AND NORETHINDRONE ACETATE .5; .1 MG/1; MG/1
1 TABLET ORAL DAILY
Qty: 90 TABLET | Refills: 0 | Status: SHIPPED | OUTPATIENT
Start: 2023-10-23 | End: 2023-12-19

## 2023-10-23 RX ORDER — ESTRADIOL AND NORETHINDRONE ACETATE .5; .1 MG/1; MG/1
1 TABLET ORAL DAILY
Qty: 90 TABLET | Refills: 0 | Status: SHIPPED | OUTPATIENT
Start: 2023-10-23 | End: 2023-10-23 | Stop reason: SDUPTHER

## 2023-11-09 NOTE — PROGRESS NOTES
Clinic Note    Reason for visit:  The primary encounter diagnosis was Gastroesophageal reflux disease, unspecified whether esophagitis present. A diagnosis of Screening for colon cancer was also pertinent to this visit.    PCP: Drake Jean Baptiste   [address] / [place] [address]    HPI:  This is a 53 y.o. female here to be established. Referred for CRC screening. She is having BM daily. She does take colace in the evening for mild constipation. She does take famotidine 20mg daily. Denies reflux. Has occasional nausea since transplant but not regularly. Last colonoscopy was over 10 years. ago    S/p  Kidney transplant 2021 due to polycystic kidney disease. She then had bilateral nephrectomy of old kidneys 2022 due to progressing polycystic liver disease. She is followed by neprho and hepatologist (Dr. Horta) at [facility]. She is followed by hepatologist annually. Her LFTs/imaging have been well.    10/13/2023 -Glucose 126H, Cr 1.74H, BUN 30H, LFTs WNL.  10/11/2023- H/H 12.6/40.7 MCV 91     CT chest w/o 8/14/2023 partially visualized diffuse polycystic liver disease. Stable from previous CT on 11/15/2022    Review of Systems   Constitutional:  Positive for fatigue. Negative for fever and unexpected weight change.   HENT:  Negative for mouth sores, postnasal drip, sore throat and trouble swallowing.    Eyes:  Negative for pain, discharge and eye dryness.   Respiratory:  Negative for apnea, cough, choking, chest tightness, shortness of breath and wheezing.    Cardiovascular:  Negative for chest pain, palpitations and leg swelling.   Gastrointestinal:  Positive for nausea. Negative for abdominal distention, abdominal pain, anal bleeding, blood in stool, change in bowel habit, constipation, diarrhea, rectal pain, vomiting, reflux and fecal incontinence.   Genitourinary:  Negative for bladder incontinence, dysuria and hematuria.   Musculoskeletal:  Negative for arthralgias, back pain and joint  swelling.   Integumentary:  Negative for color change and rash.   Allergic/Immunologic: Negative for environmental allergies and food allergies.   Neurological:  Negative for seizures and headaches.   Hematological:  Negative for adenopathy. Bruises/bleeds easily.        Past Medical History:   Diagnosis Date    Anemia     History of kidney transplant 2022    HTN (hypertension)     Intraductal papilloma of breast, right     bx by Joi 4/ 2023    Menopause     HRT    Nephrolithiasis     h/o    Polycystic kidney disease     Polycystic liver disease      Past Surgical History:   Procedure Laterality Date    BREAST BIOPSY Right     COLONOSCOPY      DIAGNOSTIC LAPAROSCOPY      HYSTEROSCOPY WITH DILATION AND CURETTAGE OF UTERUS  2011    novasure ablation    INGUINAL HERNIA REPAIR Left 2011    KIDNEY TRANSPLANT  12/07/2021    NEPHRECTOMY Bilateral 10/27/2022    to make room for her very enlarged liver (with cysts)    TONSILLECTOMY AND ADENOIDECTOMY       Family History   Problem Relation Age of Onset    Polycystic kidney disease Maternal Grandmother     Liver disease Maternal Grandmother     No Known Problems Father     Liver disease Mother     Kidney disease Mother     Hypertension Mother      Social History     Tobacco Use    Smoking status: Never    Smokeless tobacco: Never   Substance Use Topics    Alcohol use: Never    Drug use: Never     Review of patient's allergies indicates:   Allergen Reactions    Sulfa (sulfonamide antibiotics)     Metoclopramide hcl Hives     OK to take with benadryl    Nsaids (non-steroidal anti-inflammatory drug) Other (See Comments)     To protect transplanted kidney    Prochlorperazine Rash     Ok to take with Benadryl.       Medication List with Changes/Refills   New Medications    POLYETHYLENE GLYCOL (GOLYTELY) 236-22.74-6.74 -5.86 GRAM SUSPENSION    Take 4,000 mLs (4 L total) by mouth once. for 1 dose   Current Medications     INV PREDNISONE/PLACEBO TABLET        ACETAMINOPHEN  (TYLENOL) 500 MG TABLET    Take 500 mg by mouth every 6 (six) hours as needed.    ACETAMINOPHEN-PAMABROM-PYRILAM 500-25-15 MG TAB    Take 1 tablet by mouth daily as needed.    CARVEDILOL (COREG) 12.5 MG TABLET        CETIRIZINE (ZYRTEC) 10 MG TABLET    Take 10 mg by mouth daily as needed.    CYANOCOBALAMIN (VITAMIN B-12) 1000 MCG TABLET    Take 1,000 mcg by mouth.    DOCUSATE SODIUM (COLACE) 250 MG CAPSULE    Take 250 mg by mouth once daily.    ERGOCALCIFEROL (ERGOCALCIFEROL) 50,000 UNIT CAP    once a week.    ESTRADIOL-NORETHINDRONE ACET 0.5-0.1 MG PER TABLET    Take 1 tablet by mouth once daily.    FAMOTIDINE (PEPCID) 20 MG TABLET    Take 20 mg by mouth.    MAGNESIUM OXIDE (MAG-OX) 400 MG (241.3 MG MAGNESIUM) TABLET    Take 400 mg by mouth 2 (two) times daily.    MYCOPHENOLATE (CELLCEPT) 500 MG TAB        ONDANSETRON (ZOFRAN) 4 MG TABLET        PHOSPHOROUS 250 MG TAB    Take 2 tablets by mouth 3 (three) times daily.    SOD PHOS DI, MONO/K PHOS MONO (PHOSPHA 250 NEUTRAL ORAL)        TACROLIMUS (PROGRAF) 1 MG CAP    Take 1 mg by mouth.    TRETINOIN (RETIN-A) 0.025 % CREAM    APPLY EVERY NIGHT AT BEDTIME 2 TO 7 TIMES A WEEK. APPLY AFTER MOISTURIZER. MAY INCREASE USE AS TOLERATED    ZOLPIDEM (AMBIEN) 5 MG TAB       Discontinued Medications    ASPIRIN (ECOTRIN) 81 MG EC TABLET    Take 1 tablet by mouth once daily.    DOCUSATE SODIUM (COLACE) 100 MG CAPSULE    Take 100 mg by mouth 2 (two) times daily.    HYDROCODONE-ACETAMINOPHEN (NORCO) 5-325 MG PER TABLET        INSULIN GLARGINE U-300 CONC (TOUJEO MAX U-300 SOLOSTAR) 300 UNIT/ML (3 ML) INSULIN PEN    Take 1 tablet by mouth once daily.    MORPHINE (MS CONTIN) 15 MG 12 HR TABLET    Take 1 tablet by mouth.    PANTOPRAZOLE (PROTONIX) 40 MG TABLET    Take 40 mg by mouth.    SENNA-DOCUSATE 8.6-50 MG (PERICOLACE) 8.6-50 MG PER TABLET    Take 1 tablet by mouth once daily.    SODIUM CHLORIDE (OCEAN) 0.65 % NASAL SPRAY    2 sprays by Nasal route.    TRAMADOL (ULTRAM) 50 MG  "TABLET             Vital Signs:  /78 (BP Location: Right arm, Patient Position: Sitting, BP Method: Medium (Automatic))   Pulse 78   Ht 5' 6" (1.676 m)   Wt 59 kg (130 lb)   SpO2 99%   BMI 20.98 kg/m²        Physical Exam  Vitals reviewed.   Constitutional:       General: She is awake. She is not in acute distress.     Appearance: Normal appearance. She is well-developed. She is not ill-appearing, toxic-appearing or diaphoretic.   HENT:      Head: Normocephalic and atraumatic.      Nose: Nose normal.      Mouth/Throat:      Mouth: Mucous membranes are moist.      Pharynx: Oropharynx is clear. No oropharyngeal exudate or posterior oropharyngeal erythema.   Eyes:      General: Lids are normal. Gaze aligned appropriately. No scleral icterus.        Right eye: No discharge.         Left eye: No discharge.      Conjunctiva/sclera: Conjunctivae normal.   Neck:      Trachea: Trachea normal.   Cardiovascular:      Rate and Rhythm: Normal rate and regular rhythm.      Pulses:           Radial pulses are 2+ on the right side and 2+ on the left side.   Pulmonary:      Effort: Pulmonary effort is normal. No respiratory distress.      Breath sounds: No stridor. No wheezing.   Chest:      Chest wall: No tenderness.   Abdominal:      General: Bowel sounds are normal. There is no distension.      Palpations: Abdomen is soft. There is hepatomegaly. There is no fluid wave or mass.      Tenderness: There is no abdominal tenderness. There is no guarding or rebound.   Musculoskeletal:         General: No tenderness or deformity.      Cervical back: Full passive range of motion without pain and neck supple. No tenderness.      Right lower leg: No edema.      Left lower leg: No edema.   Lymphadenopathy:      Cervical: No cervical adenopathy.   Skin:     General: Skin is warm and dry.      Capillary Refill: Capillary refill takes less than 2 seconds.      Coloration: Skin is not cyanotic, jaundiced or pale.   Neurological:      " General: No focal deficit present.      Mental Status: She is alert and oriented to person, place, and time.      Motor: No tremor.   Psychiatric:         Attention and Perception: Attention normal.         Mood and Affect: Mood and affect normal.         Speech: Speech normal.         Behavior: Behavior normal. Behavior is cooperative.            All of the data above and below has been reviewed by myself and any further interpretations will be reflected in the assessment and plan.   The data includes review of external notes, and independent interpretation of lab results, procedures, x-rays, and imaging reports.      Assessment:  Gastroesophageal reflux disease, unspecified whether esophagitis present    Screening for colon cancer  -     Ambulatory referral/consult to Gastroenterology  -     Ambulatory Referral to External Surgery  -     Discontinue: polyethylene glycol (GOLYTELY) 236-22.74-6.74 -5.86 gram suspension; Take 4,000 mLs (4 L total) by mouth once. for 1 dose  Dispense: 4000 mL; Refill: 0    Other orders  -     polyethylene glycol (GOLYTELY) 236-22.74-6.74 -5.86 gram suspension; Take 4,000 mLs (4 L total) by mouth once. for 1 dose  Dispense: 4000 mL; Refill: 0    Due for Colonoscopy - will prep with Golytely due to kidney disease.  Polycystic liver- followed by Hepatology   GERD- controlled with famotidine.     Recommendations:    Schedule colonoscopy with Dr. Stewart.    Risks, benefits, and alternatives of medical management, any associated procedures, and/or treatment discussed with the patient. Patient given opportunity to ask questions and voices understanding. Patient has elected to proceed with the recommended care modalities as discussed.    Follow up if symptoms worsen or fail to improve.    Order summary:  Orders Placed This Encounter   Procedures    Ambulatory Referral to External Surgery        Instructed patient to notify my office if they have not been contacted within two weeks after any  procedures, submitting any samples (biopsies, blood, stool, urine, etc.) or after any imaging (X-ray, CT, MRI, etc.).      Aspen Wynne NP    This document may have been created using a voice recognition transcribing system. Incorrect words or phrases may have been missed during proofreading. Please interpret accordingly or contact me for clarification.

## 2023-11-10 ENCOUNTER — OFFICE VISIT (OUTPATIENT)
Dept: GASTROENTEROLOGY | Facility: CLINIC | Age: 53
End: 2023-11-10
Payer: COMMERCIAL

## 2023-11-10 VITALS
DIASTOLIC BLOOD PRESSURE: 78 MMHG | HEIGHT: 66 IN | HEART RATE: 78 BPM | SYSTOLIC BLOOD PRESSURE: 127 MMHG | WEIGHT: 130 LBS | OXYGEN SATURATION: 99 % | BODY MASS INDEX: 20.89 KG/M2

## 2023-11-10 DIAGNOSIS — K21.9 GASTROESOPHAGEAL REFLUX DISEASE, UNSPECIFIED WHETHER ESOPHAGITIS PRESENT: Primary | ICD-10-CM

## 2023-11-10 DIAGNOSIS — Z12.11 SCREENING FOR COLON CANCER: ICD-10-CM

## 2023-11-10 PROCEDURE — 99203 PR OFFICE/OUTPT VISIT, NEW, LEVL III, 30-44 MIN: ICD-10-PCS | Mod: S$GLB,,, | Performed by: NURSE PRACTITIONER

## 2023-11-10 PROCEDURE — 99203 OFFICE O/P NEW LOW 30 MIN: CPT | Mod: S$GLB,,, | Performed by: NURSE PRACTITIONER

## 2023-11-10 RX ORDER — DOCUSATE SODIUM 250 MG
250 CAPSULE ORAL DAILY
COMMUNITY

## 2023-11-10 RX ORDER — POLYETHYLENE GLYCOL 3350, SODIUM SULFATE ANHYDROUS, SODIUM BICARBONATE, SODIUM CHLORIDE, POTASSIUM CHLORIDE 236; 22.74; 6.74; 5.86; 2.97 G/4L; G/4L; G/4L; G/4L; G/4L
4 POWDER, FOR SOLUTION ORAL ONCE
Qty: 4000 ML | Refills: 0 | Status: SHIPPED | OUTPATIENT
Start: 2023-11-10 | End: 2023-11-10

## 2023-11-10 NOTE — PATIENT INSTRUCTIONS
Schedule colonoscopy with Dr. Stewart.    Please notify my office if you have not been contacted within two weeks after any procedures, submitting any samples (biopsies, blood, stool, urine, etc.) or after any imaging (X-ray, CT, MRI, etc.).

## 2023-11-14 NOTE — PROGRESS NOTES
Please let pt know her bone density shows she has some osteoporosis so she needs to get with her pcp to address this and consider treatment

## 2023-11-29 ENCOUNTER — TELEPHONE (OUTPATIENT)
Dept: GASTROENTEROLOGY | Facility: CLINIC | Age: 53
End: 2023-11-29

## 2023-11-29 NOTE — TELEPHONE ENCOUNTER
Called pt off the wait list to see if she would like to move her colonoscopy to 12/4/23 Mon with NBP. Pt stated she has an appt in [place] regarding her liver transplant. She request to remain on the wait list. rickie

## 2023-12-19 DIAGNOSIS — N95.1 MENOPAUSAL SYMPTOMS: ICD-10-CM

## 2023-12-19 RX ORDER — ESTRADIOL AND NORETHINDRONE ACETATE .5; .1 MG/1; MG/1
1 TABLET ORAL DAILY
Qty: 90 TABLET | Refills: 0 | Status: SHIPPED | OUTPATIENT
Start: 2023-12-19 | End: 2023-12-20 | Stop reason: SDUPTHER

## 2023-12-19 NOTE — TELEPHONE ENCOUNTER
Please see the attached refill request. Please see pharmacy note. Patient had her annual in October.

## 2023-12-20 ENCOUNTER — PATIENT MESSAGE (OUTPATIENT)
Dept: OBSTETRICS AND GYNECOLOGY | Facility: CLINIC | Age: 53
End: 2023-12-20
Payer: MEDICARE

## 2023-12-20 DIAGNOSIS — N95.1 MENOPAUSAL SYMPTOMS: ICD-10-CM

## 2023-12-20 RX ORDER — ESTRADIOL AND NORETHINDRONE ACETATE .5; .1 MG/1; MG/1
1 TABLET ORAL DAILY
Qty: 90 TABLET | Refills: 3 | Status: SHIPPED | OUTPATIENT
Start: 2023-12-20

## 2024-01-05 ENCOUNTER — TELEPHONE (OUTPATIENT)
Dept: GASTROENTEROLOGY | Facility: CLINIC | Age: 54
End: 2024-01-05
Payer: MEDICARE

## 2024-01-05 VITALS — HEIGHT: 66 IN | BODY MASS INDEX: 20.89 KG/M2 | WEIGHT: 130 LBS

## 2024-01-05 DIAGNOSIS — Z12.11 SCREENING FOR COLON CANCER: Primary | ICD-10-CM

## 2024-01-05 NOTE — TELEPHONE ENCOUNTER
"[facility]  [address] [place] [address]  Phone: [phone]  Fax: [phone]    History & Physical         Provider: Dr. Radha Stewart    Patient Name: Lubna GROSS (age):1970  53 y.o.           Gender: female   Phone: [phone]     Referring Physician: Drake Jean Baptiste     Vital Signs:   Height - 5' 6"  Weight - 130 LB  BMI -  20.98    Plan: COLONOSCOPY @ [facility]    Encounter Diagnosis   Name Primary?    Screening for colon cancer Yes           History:      Past Medical History:   Diagnosis Date    Anemia     History of kidney transplant     HTN (hypertension)     Intraductal papilloma of breast, right     bx by Joi 2023    Menopause     HRT    Nephrolithiasis     h/o    Polycystic kidney disease     Polycystic liver disease       Past Surgical History:   Procedure Laterality Date    BREAST BIOPSY Right     COLONOSCOPY      DIAGNOSTIC LAPAROSCOPY      HYSTEROSCOPY WITH DILATION AND CURETTAGE OF UTERUS      novasure ablation    INGUINAL HERNIA REPAIR Left     KIDNEY TRANSPLANT  2021    NEPHRECTOMY Bilateral 10/27/2022    to make room for her very enlarged liver (with cysts)    TONSILLECTOMY AND ADENOIDECTOMY        Medication List with Changes/Refills   Current Medications     INV PREDNISONE/PLACEBO TABLET        ACETAMINOPHEN (TYLENOL) 500 MG TABLET    Take 500 mg by mouth every 6 (six) hours as needed.    ACETAMINOPHEN-PAMABROM-PYRILAM 500-25-15 MG TAB    Take 1 tablet by mouth daily as needed.    CARVEDILOL (COREG) 12.5 MG TABLET        CETIRIZINE (ZYRTEC) 10 MG TABLET    Take 10 mg by mouth daily as needed.    CYANOCOBALAMIN (VITAMIN B-12) 1000 MCG TABLET    Take 1,000 mcg by mouth.    DOCUSATE SODIUM (COLACE) 250 MG CAPSULE    Take 250 mg by mouth once daily.    ERGOCALCIFEROL (ERGOCALCIFEROL) 50,000 UNIT CAP    once a week.    ESTRADIOL-NORETHINDRONE " ACET 0.5-0.1 MG PER TABLET    Take 1 tablet by mouth once daily.    FAMOTIDINE (PEPCID) 20 MG TABLET    Take 20 mg by mouth.    MAGNESIUM OXIDE (MAG-OX) 400 MG (241.3 MG MAGNESIUM) TABLET    Take 400 mg by mouth 2 (two) times daily.    MYCOPHENOLATE (CELLCEPT) 500 MG TAB        ONDANSETRON (ZOFRAN) 4 MG TABLET        PHOSPHOROUS 250 MG TAB    Take 2 tablets by mouth 3 (three) times daily.    SOD PHOS DI, MONO/K PHOS MONO (PHOSPHA 250 NEUTRAL ORAL)        TACROLIMUS (PROGRAF) 1 MG CAP    Take 1 mg by mouth.    TRETINOIN (RETIN-A) 0.025 % CREAM    APPLY EVERY NIGHT AT BEDTIME 2 TO 7 TIMES A WEEK. APPLY AFTER MOISTURIZER. MAY INCREASE USE AS TOLERATED    ZOLPIDEM (AMBIEN) 5 MG TAB          Review of patient's allergies indicates:   Allergen Reactions    Sulfa (sulfonamide antibiotics)     Metoclopramide hcl Hives     OK to take with benadryl    Nsaids (non-steroidal anti-inflammatory drug) Other (See Comments)     To protect transplanted kidney    Prochlorperazine Rash     Ok to take with Benadryl.       Family History   Problem Relation Age of Onset    Polycystic kidney disease Maternal Grandmother     Liver disease Maternal Grandmother     No Known Problems Father     Liver disease Mother     Kidney disease Mother     Hypertension Mother       Social History     Tobacco Use    Smoking status: Never    Smokeless tobacco: Never   Substance Use Topics    Alcohol use: Never    Drug use: Never        Physical Examination:     General Appearance:___________________________  HEENT: _____________________________________  Abdomen:____________________________________  Heart:________________________________________  Lungs:_______________________________________  Extremities:___________________________________  Skin:_________________________________________  Endocrine:____________________________________  Genitourinary:_________________________________  Neurological:__________________________________      Patient has been  evaluated immediately prior to sedation and is medically cleared for endoscopy with IVCS as an ASA class: ______      Physician Signature: _________________________       Date: ________  Time: ________

## 2024-01-09 ENCOUNTER — OUTSIDE PLACE OF SERVICE (OUTPATIENT)
Dept: GASTROENTEROLOGY | Facility: CLINIC | Age: 54
End: 2024-01-09

## 2024-01-09 LAB — CRC RECOMMENDATION EXT: NORMAL

## 2024-01-09 PROCEDURE — G0121 COLON CA SCRN NOT HI RSK IND: HCPCS | Mod: ,,, | Performed by: INTERNAL MEDICINE

## 2024-01-25 ENCOUNTER — DOCUMENTATION ONLY (OUTPATIENT)
Dept: GASTROENTEROLOGY | Facility: CLINIC | Age: 54
End: 2024-01-25
Payer: MEDICARE

## 2024-01-25 LAB — CRC RECOMMENDATION EXT: NORMAL

## 2024-04-12 ENCOUNTER — DOCUMENTATION ONLY (OUTPATIENT)
Dept: GASTROENTEROLOGY | Facility: CLINIC | Age: 54
End: 2024-04-12
Payer: MEDICARE

## 2024-10-20 PROBLEM — I89.0 LYMPHEDEMA: Status: ACTIVE | Noted: 2024-10-20

## 2024-10-20 PROBLEM — Q44.6 POLYCYSTIC LIVER DISEASE: Status: RESOLVED | Noted: 2021-12-08 | Resolved: 2024-10-20

## 2024-10-20 PROBLEM — R55 SYNCOPE: Status: ACTIVE | Noted: 2023-10-11

## 2024-10-20 PROBLEM — Q44.6: Status: ACTIVE | Noted: 2023-01-13

## 2024-10-20 PROBLEM — N17.9 ACUTE KIDNEY FAILURE, UNSPECIFIED: Status: ACTIVE | Noted: 2022-11-15

## 2024-10-20 NOTE — PROGRESS NOTES
"CC:  WELL WOMAN (menopausal since "years ago")  Patient Care Team:  Drake Jean Baptiste MD as PCP - General (Family Medicine)  Lubna Tamez MD as Consulting Physician (General Surgery)  Bubba Araujo MD as Consulting Physician (Nephrology)  Osmel Ortiz MD (Nephrology)    Last visit with me was on  10/18/2023 for wwe    HPI:  Patient is a 54 y.o. who presents for her well woman exam today.  History reviewed with patient. Has tried off her hrt but had difficulty sleeping from the hot flashes. Her hepatologist has encouraged her to get off the hrt but liver function still good. Discussed trying bonafide products or switching to topical hrt and will research and let us know if she wants to change  Patient is without complaints or concerns today.     HRT: combination  HX ABNL PAPS: yrs ago-no treatment needed    REVIEW OF PRIOR DATA/ HEALTH MAINTENANCE:  LAST ANNUAL:   2023    LAST MMG (screening)- 2024-  [facility]    LAST COLONOSCOPY- 20+ yrs   LAST DEXA- - osteoporosis at out of town     Past Medical History:   Diagnosis Date    Anemia     History of kidney transplant     HTN (hypertension)     Intraductal papilloma of breast, right     bx by Joi 2023    Menopause     HRT    Nephrolithiasis     h/o    Polycystic kidney disease     Polycystic liver disease      SURGICAL HX:   has a past surgical history that includes Tonsillectomy and adenoidectomy; Diagnostic laparoscopy; Inguinal hernia repair (Left, ); Hysteroscopy with dilation and curettage of uterus (); Kidney transplant (2021); Nephrectomy (Bilateral, 10/27/2022); Breast biopsy (Right); and Colonoscopy.    SOCIAL HX:    reports that she has never smoked. She has never used smokeless tobacco. She reports that she does not drink alcohol and does not use drugs.    Current Outpatient Medications   Medication Instructions     INV predniSONE/placebo tablet No dose, route, or frequency " recorded.    acetaminophen (TYLENOL) 500 mg, Every 6 hours PRN    acetaminophen-pamabrom-pyrilam 500-25-15 mg Tab 1 tablet, Daily PRN    carvediloL (COREG) 12.5 MG tablet No dose, route, or frequency recorded.    cetirizine (ZYRTEC) 10 mg, Daily PRN    cyanocobalamin (VITAMIN B-12) 1,000 mcg    docusate sodium (COLACE) 250 mg, Daily    ergocalciferol (ERGOCALCIFEROL) 50,000 unit Cap once a week.    estradiol-norethindrone acet 0.5-0.1 mg per tablet 1 tablet, Oral, Daily    famotidine (PEPCID) 20 mg    magnesium oxide (MAG-OX) 400 mg, 2 times daily    mycophenolate (CELLCEPT) 500 mg Tab No dose, route, or frequency recorded.    ondansetron (ZOFRAN) 4 MG tablet No dose, route, or frequency recorded.    sod phos di, mono/K phos mono (PHOSPHA 250 NEUTRAL ORAL) No dose, route, or frequency recorded.    tacrolimus (PROGRAF) 1 mg    tretinoin (RETIN-A) 0.025 % cream APPLY EVERY NIGHT AT BEDTIME 2 TO 7 TIMES A WEEK. APPLY AFTER MOISTURIZER. MAY INCREASE USE AS TOLERATED    zolpidem (AMBIEN) 5 MG Tab No dose, route, or frequency recorded.     VITALS:  Blood pressure 115/79, pulse 75, weight 63.1 kg (139 lb 3.2 oz).  Body mass index is 22.47 kg/m².     PHYSICAL EXAM-  APPEARANCE: Well appearing, in no acute distress.   CV/PULM: No resp distress, normal resp effort   PSYCH:  Normal mood and affect, cooperative   SKIN: No rashes, lesions, or abnormal bruising   ABD: Soft, without tenderness or masses.   BREAST: deferred/declined  PELVIC:  VULVA: Normal female genitalia. No lesions.   URETHRAL MEATUS: No masses, no significant prolapse.   BLADDER/ URETHRA: No masses or suprapubic tenderness   VAGINA/ CVX: No lesions. +atrophic changes. No discharge   UTERUS:  mobile, non-tender   ADNEXA: No masses, tenderness, or fullness   ANUS/ PERINEUM: Normal tone.  No lesions.           *patient verbally consented for exam and female chaperone present for entire exam     ASSESSMENT and PLAN:  Encounter for well woman exam with routine  gynecological exam  -     Liquid-based pap smear, screening    Breast cancer screening by mammogram  -     Mammo Digital Screening Bilat w/ [facility]; Future; Expected date: 11/03/2024    Screening for colon cancer  -     Ambulatory referral/consult to Gastroenterology       FOLLOWUP:  1 year for wwe or sooner prn    COUNSELING:  Patient was counseled today on recommendation for yearly pelvic exam, current Pap guidelines, self breast exams, annual screening mammograms, routine screening colonoscopy , and bone density testing.  Discussed vitamin D and calcium supplements as well as weight bearing exercise to decrease risks. Encouraged patient to see her PCP for other health maintenance.

## 2024-10-22 ENCOUNTER — OFFICE VISIT (OUTPATIENT)
Dept: OBSTETRICS AND GYNECOLOGY | Facility: CLINIC | Age: 54
End: 2024-10-22
Payer: MEDICARE

## 2024-10-22 VITALS
BODY MASS INDEX: 22.47 KG/M2 | SYSTOLIC BLOOD PRESSURE: 115 MMHG | DIASTOLIC BLOOD PRESSURE: 79 MMHG | HEART RATE: 75 BPM | WEIGHT: 139.19 LBS

## 2024-10-22 DIAGNOSIS — Z01.419 ENCOUNTER FOR WELL WOMAN EXAM WITH ROUTINE GYNECOLOGICAL EXAM: Primary | ICD-10-CM

## 2024-10-22 DIAGNOSIS — Z12.11 SCREENING FOR COLON CANCER: ICD-10-CM

## 2024-10-22 DIAGNOSIS — Z12.31 BREAST CANCER SCREENING BY MAMMOGRAM: ICD-10-CM

## 2024-10-22 PROCEDURE — G0101 CA SCREEN;PELVIC/BREAST EXAM: HCPCS | Mod: S$PBB,,, | Performed by: OBSTETRICS & GYNECOLOGY

## 2024-10-23 LAB — Lab: NORMAL

## 2024-10-30 ENCOUNTER — TELEPHONE (OUTPATIENT)
Dept: GASTROENTEROLOGY | Facility: CLINIC | Age: 54
End: 2024-10-30
Payer: MEDICARE

## 2024-10-30 ENCOUNTER — PATIENT MESSAGE (OUTPATIENT)
Dept: OBSTETRICS AND GYNECOLOGY | Facility: CLINIC | Age: 54
End: 2024-10-30
Payer: MEDICARE

## 2024-12-12 ENCOUNTER — DOCUMENTATION ONLY (OUTPATIENT)
Dept: OBSTETRICS AND GYNECOLOGY | Facility: CLINIC | Age: 54
End: 2024-12-12
Payer: MEDICARE

## 2025-01-27 ENCOUNTER — PATIENT MESSAGE (OUTPATIENT)
Dept: OBSTETRICS AND GYNECOLOGY | Facility: CLINIC | Age: 55
End: 2025-01-27
Payer: MEDICARE

## 2025-04-30 DIAGNOSIS — N95.1 MENOPAUSAL SYMPTOMS: ICD-10-CM

## 2025-04-30 RX ORDER — ESTRADIOL AND NORETHINDRONE ACETATE .5; .1 MG/1; MG/1
1 TABLET ORAL
Qty: 84 TABLET | Refills: 3 | Status: SHIPPED | OUTPATIENT
Start: 2025-04-30